# Patient Record
Sex: FEMALE | Race: OTHER | NOT HISPANIC OR LATINO | URBAN - METROPOLITAN AREA
[De-identification: names, ages, dates, MRNs, and addresses within clinical notes are randomized per-mention and may not be internally consistent; named-entity substitution may affect disease eponyms.]

---

## 2022-06-19 ENCOUNTER — TELEPHONE (OUTPATIENT)
Dept: URGENT CARE | Facility: CLINIC | Age: 32
End: 2022-06-19
Payer: MEDICAID

## 2024-08-23 ENCOUNTER — TELEPHONE (OUTPATIENT)
Dept: OBSTETRICS AND GYNECOLOGY | Facility: CLINIC | Age: 34
End: 2024-08-23
Payer: MEDICAID

## 2024-08-23 NOTE — TELEPHONE ENCOUNTER
LVM.  Notified pt that I will be leaving the office now and that if she calls back, one of our other nurses can assist her and schedule appt if needed.  [facility] a few appts tomorrow.

## 2024-08-26 NOTE — TELEPHONE ENCOUNTER
Called pt back to advise per Summer.    Pt states she had called the [facility] and states that the next available wasn't until end of this year and that's why she sent a message to us to see if she can be seen sooner.  Pt states she should just go to [facility] once cycle is finished.    Advised I will check with Summer to see what she recommends.

## 2024-08-26 NOTE — TELEPHONE ENCOUNTER
Pt has been having vaginal burning, itching, and uncertain about odor.  Denies d/c.  Currently on cycle.  Would like appt for swabs.  Pt is going to have cancerous mole sx on Tuesday and also want to have a mole on her vagina evaluated as well.  Pt mentioned she would like to be tested for HPV as well.    Scheduled.

## 2025-04-23 ENCOUNTER — PATIENT MESSAGE (OUTPATIENT)
Dept: OBSTETRICS AND GYNECOLOGY | Facility: CLINIC | Age: 35
End: 2025-04-23

## 2025-04-23 ENCOUNTER — TELEPHONE (OUTPATIENT)
Dept: OBSTETRICS AND GYNECOLOGY | Facility: CLINIC | Age: 35
End: 2025-04-23

## 2025-04-23 NOTE — TELEPHONE ENCOUNTER
Attempted to call pt in re: to 4/28 appt. VM box was full, unable to leave message. Will send a poral message asking for cb.    ANGELICA Shelton  [phone]